# Patient Record
Sex: FEMALE | Race: WHITE | NOT HISPANIC OR LATINO | Employment: FULL TIME | URBAN - METROPOLITAN AREA
[De-identification: names, ages, dates, MRNs, and addresses within clinical notes are randomized per-mention and may not be internally consistent; named-entity substitution may affect disease eponyms.]

---

## 2023-09-11 ENCOUNTER — HOSPITAL ENCOUNTER (OUTPATIENT)
Dept: DATA CONVERSION | Facility: HOSPITAL | Age: 38
Discharge: HOME | End: 2023-09-11
Payer: COMMERCIAL

## 2023-09-11 DIAGNOSIS — Z01.419 ENCOUNTER FOR GYNECOLOGICAL EXAMINATION (GENERAL) (ROUTINE) WITHOUT ABNORMAL FINDINGS: ICD-10-CM

## 2023-09-26 PROBLEM — F41.8 OTHER SPECIFIED ANXIETY DISORDERS: Status: ACTIVE | Noted: 2023-09-26

## 2023-09-26 PROBLEM — O13.9 PREGNANCY INDUCED HYPERTENSION (HHS-HCC): Status: ACTIVE | Noted: 2023-09-26

## 2023-09-26 PROBLEM — O24.419 GESTATIONAL DIABETES MELLITUS (HHS-HCC): Status: ACTIVE | Noted: 2023-09-26

## 2023-09-26 PROBLEM — E10.9 TYPE 1 DIABETES MELLITUS (MULTI): Status: ACTIVE | Noted: 2023-09-26

## 2023-09-26 RX ORDER — IBUPROFEN 200 MG
TABLET ORAL
COMMUNITY
End: 2023-11-14 | Stop reason: ALTCHOICE

## 2023-09-26 RX ORDER — SERTRALINE HYDROCHLORIDE 50 MG/1
TABLET, FILM COATED ORAL
COMMUNITY
Start: 2020-04-28 | End: 2023-11-14 | Stop reason: ALTCHOICE

## 2023-09-26 RX ORDER — SYRINGE WITH NEEDLE, INSULIN
SYRINGE, EMPTY DISPOSABLE MISCELLANEOUS
COMMUNITY
End: 2023-11-14 | Stop reason: ALTCHOICE

## 2023-09-26 RX ORDER — SEMAGLUTIDE 2.68 MG/ML
INJECTION, SOLUTION SUBCUTANEOUS
COMMUNITY
Start: 2023-08-19 | End: 2023-11-14 | Stop reason: ALTCHOICE

## 2023-09-26 RX ORDER — SEMAGLUTIDE 1.34 MG/ML
INJECTION, SOLUTION SUBCUTANEOUS
COMMUNITY
Start: 2023-07-17 | End: 2023-11-14 | Stop reason: ALTCHOICE

## 2023-09-26 RX ORDER — SEMAGLUTIDE 0.68 MG/ML
0.5 INJECTION, SOLUTION SUBCUTANEOUS
COMMUNITY
Start: 2023-06-07 | End: 2023-11-14 | Stop reason: ALTCHOICE

## 2023-10-23 ENCOUNTER — ANCILLARY PROCEDURE (OUTPATIENT)
Dept: RADIOLOGY | Facility: CLINIC | Age: 38
End: 2023-10-23
Payer: COMMERCIAL

## 2023-10-23 ENCOUNTER — OFFICE VISIT (OUTPATIENT)
Dept: ORTHOPEDIC SURGERY | Facility: CLINIC | Age: 38
End: 2023-10-23
Payer: COMMERCIAL

## 2023-10-23 DIAGNOSIS — M25.512 LEFT SHOULDER PAIN, UNSPECIFIED CHRONICITY: ICD-10-CM

## 2023-10-23 DIAGNOSIS — M54.12 CERVICAL RADICULOPATHY: Primary | ICD-10-CM

## 2023-10-23 PROCEDURE — 1036F TOBACCO NON-USER: CPT | Performed by: ORTHOPAEDIC SURGERY

## 2023-10-23 PROCEDURE — 73030 X-RAY EXAM OF SHOULDER: CPT | Mod: LT

## 2023-10-23 PROCEDURE — 99203 OFFICE O/P NEW LOW 30 MIN: CPT | Performed by: ORTHOPAEDIC SURGERY

## 2023-10-23 ASSESSMENT — ENCOUNTER SYMPTOMS
CHILLS: 0
EYE DISCHARGE: 0
COLOR CHANGE: 0
WHEEZING: 0
FEVER: 0
TROUBLE SWALLOWING: 0
JOINT SWELLING: 1
SHORTNESS OF BREATH: 0

## 2023-10-23 ASSESSMENT — PAIN SCALES - GENERAL: PAINLEVEL_OUTOF10: 2

## 2023-10-23 ASSESSMENT — PAIN - FUNCTIONAL ASSESSMENT: PAIN_FUNCTIONAL_ASSESSMENT: 0-10

## 2023-10-23 NOTE — PROGRESS NOTES
Reason for Appointment  L shoulder blade pain     History of Present Illness  New patient is a 37 y.o. female here today for evaluation of her left shoulder.  She has had increased pain over the left trapezius and left medial scapular border.  She has had pain radiating down the arm and burning in the left forearm.  X-rays taken today of the left shoulder are reviewed and are normal.  No specific injury and recall.  She has been going to the chiropractor and symptoms were improving but over the last few weeks she has had recurrent symptoms.     History reviewed. No pertinent past medical history.    History reviewed. No pertinent surgical history.    Medication Documentation Review Audit       Reviewed by Izzy Salvador MA (Medical Assistant) on 10/23/23 at 1427      Medication Order Taking? Sig Documenting Provider Last Dose Status   ibuprofen 200 mg tablet [id number]  Directions: 3 tablet oral every six hours PRN pain Historical Provider, MD  Active   Ozempic 0.25 mg or 0.5 mg (2 mg/3 mL) pen injector [id number]  Inject 0.5 mg under the skin 1 (one) time per week. Historical Provider, MD  Active   Ozempic 1 mg/dose (4 mg/3 mL) pen injector [id number]  inject 1 mg subcutaneously every week Historical Provider, MD  Active   Ozempic 2 mg/dose (8 mg/3 mL) pen injector [id number]  inject 2 mg subcutaneously once every week Historical Provider, MD  Active   PNV133-ferrous fumarate-FA (Prenatal)  mg-mcg tablet [id number]  1 tablet Orally Once a day for 30 day(s) Historical Provider, MD  Active   sertraline (Zoloft) 50 mg tablet [id number]  1 tablet Orally Once a day for 30 day(s) Historical Provider, MD  Active                    No Known Allergies    Review of Systems   Constitutional:  Negative for chills and fever.   HENT:  Negative for mouth sores and trouble swallowing.    Eyes:  Negative for discharge.   Respiratory:  Negative for shortness of breath and wheezing.    Cardiovascular:  Negative for chest pain.    Musculoskeletal:  Positive for joint swelling.   Skin:  Negative for color change and rash.   All other systems reviewed and are negative.    Exam   On exam she has good neck motion, she has tenderness over the left trapezius and left medial scapular border.  Shoulder exam is normal, she has full active forward flexion, good cuff strength with resisted external rotation and negative impingement signs.  Deltoid is functional.  Negative Tinel's over the left ulnar and median nerves.  No atrophy in the hand.  Good pulses and sensation in the upper extremity.    Assessment   Encounter Diagnosis   Name Primary?    Left shoulder pain, unspecified chronicity    Cervical radiculopahty    Plan   She is having classic cervical radicular symptoms going down the left arm.  She has a normal shoulder exam and we will refer her to pain management for further evaluation and treatment of her neck.  We would be happy to see her back at any point for any further issues.    Written by Evelin Ruiz saw, evaluated, and treated the patient with the PA

## 2023-11-14 ENCOUNTER — PRE-ADMISSION TESTING (OUTPATIENT)
Dept: PREADMISSION TESTING | Facility: HOSPITAL | Age: 38
End: 2023-11-14
Payer: COMMERCIAL

## 2023-11-14 VITALS
SYSTOLIC BLOOD PRESSURE: 130 MMHG | HEIGHT: 64 IN | RESPIRATION RATE: 16 BRPM | TEMPERATURE: 96.6 F | WEIGHT: 157.85 LBS | HEART RATE: 80 BPM | OXYGEN SATURATION: 100 % | BODY MASS INDEX: 26.95 KG/M2 | DIASTOLIC BLOOD PRESSURE: 80 MMHG

## 2023-11-14 PROCEDURE — 99203 OFFICE O/P NEW LOW 30 MIN: CPT | Performed by: NURSE PRACTITIONER

## 2023-11-14 ASSESSMENT — DUKE ACTIVITY SCORE INDEX (DASI)
TOTAL_SCORE: 58.2
CAN YOU WALK INDOORS, SUCH AS AROUND YOUR HOUSE: YES
CAN YOU CLIMB A FLIGHT OF STAIRS OR WALK UP A HILL: YES
CAN YOU DO YARD WORK LIKE RAKING LEAVES, WEEDING OR PUSHING A MOWER: YES
CAN YOU TAKE CARE OF YOURSELF (EAT, DRESS, BATHE, OR USE TOILET): YES
CAN YOU DO HEAVY WORK AROUND THE HOUSE LIKE SCRUBBING FLOORS OR LIFTING AND MOVING HEAVY FURNITURE: YES
CAN YOU WALK A BLOCK OR TWO ON LEVEL GROUND: YES
CAN YOU RUN A SHORT DISTANCE: YES
CAN YOU PARTICIPATE IN STRENOUS SPORTS LIKE SWIMMING, SINGLES TENNIS, FOOTBALL, BASKETBALL, OR SKIING: YES
CAN YOU HAVE SEXUAL RELATIONS: YES
CAN YOU PARTICIPATE IN MODERATE RECREATIONAL ACTIVITIES LIKE GOLF, BOWLING, DANCING, DOUBLES TENNIS OR THROWING A BASEBALL OR FOOTBALL: YES
DASI METS SCORE: 9.9
CAN YOU DO MODERATE WORK AROUND THE HOUSE LIKE VACUUMING, SWEEPING FLOORS OR CARRYING GROCERIES: YES
CAN YOU DO LIGHT WORK AROUND THE HOUSE LIKE DUSTING OR WASHING DISHES: YES

## 2023-11-14 ASSESSMENT — ENCOUNTER SYMPTOMS
RESPIRATORY NEGATIVE: 1
CONSTITUTIONAL NEGATIVE: 1
GASTROINTESTINAL NEGATIVE: 1
CARDIOVASCULAR NEGATIVE: 1
EYES NEGATIVE: 1
NEUROLOGICAL NEGATIVE: 1
MUSCULOSKELETAL NEGATIVE: 1
ENDOCRINE NEGATIVE: 1
PSYCHIATRIC NEGATIVE: 1
HEMATOLOGIC/LYMPHATIC NEGATIVE: 1

## 2023-11-14 ASSESSMENT — CHADS2 SCORE
AGE GREATER THAN OR EQUAL TO 75: NO
PRIOR STROKE OR TIA OR THROMBOEMBOLISM: NO
DIABETES: NO
CHADS2 SCORE: 0
HYPERTENSION: NO
CHF: NO

## 2023-11-14 ASSESSMENT — PAIN - FUNCTIONAL ASSESSMENT: PAIN_FUNCTIONAL_ASSESSMENT: 0-10

## 2023-11-14 ASSESSMENT — PAIN SCALES - GENERAL: PAINLEVEL_OUTOF10: 0 - NO PAIN

## 2023-11-14 NOTE — H&P (VIEW-ONLY)
CPM/PAT Evaluation       Name: Fidelia Bowles (Fidelia Bowles)  /Age: 1985/37 y.o.     In-Person       Chief Complaint: ELECTIVE STERILIZATION     HPI  A 37-year-old female for elective sterilization.  History of childbirth x 3.  Denies any recent fever, chills, nausea, vomiting, abdominal pain, dysuria or hematuria.  She is scheduled for laparoscopic bilateral salpingectomy.    Past Medical History:   Diagnosis Date    Asthma     allergy induced    H/O gestational diabetes mellitus, not currently pregnant     Hypertension     h/o gestational HTN       Past Surgical History:   Procedure Laterality Date    APPENDECTOMY      BUNIONECTOMY Left     DILATION AND CURETTAGE OF UTERUS      TUMOR EXCISION      right leg benign         No Known Allergies    Current Outpatient Medications   Medication Sig Dispense Refill    ALBUTEROL INHL Inhale 2 puffs every 6 hours if needed (ASTHMA).       No current facility-administered medications for this visit.       Review of Systems   Constitutional: Negative.    HENT: Negative.     Eyes: Negative.    Respiratory: Negative.          Allergy induced asthma-cats, leaves, laughing   Cardiovascular: Negative.    Gastrointestinal: Negative.    Endocrine: Negative.    Genitourinary: Negative.    Musculoskeletal: Negative.    Skin: Negative.    Neurological: Negative.    Hematological: Negative.    Psychiatric/Behavioral: Negative.          Physical Exam  Vitals reviewed.   Constitutional:       Appearance: Normal appearance.   HENT:      Head: Normocephalic and atraumatic.      Mouth/Throat:      Mouth: Mucous membranes are moist.   Eyes:      Pupils: Pupils are equal, round, and reactive to light.      Comments: Contact lenses   Cardiovascular:      Rate and Rhythm: Normal rate and regular rhythm.      Heart sounds: Normal heart sounds.   Pulmonary:      Effort: Pulmonary effort is normal.      Breath sounds: Normal breath sounds.   Abdominal:      Palpations: Abdomen is soft.  "  Musculoskeletal:         General: Normal range of motion.      Cervical back: Normal range of motion.   Skin:     General: Skin is warm and dry.   Neurological:      Mental Status: She is alert and oriented to person, place, and time.   Psychiatric:         Mood and Affect: Mood normal.          PAT AIRWAY:   Airway:     Mallampati::  I    Neck ROM::  Full  normal        /80   Pulse 80   Temp 35.9 °C (96.6 °F) (Temporal)   Resp 16   Ht 1.626 m (5' 4\")   Wt 71.6 kg (157 lb 13.6 oz)   LMP 11/06/2023 (Exact Date)   SpO2 100%   BMI 27.09 kg/m²     CHADS2 Score: 0    Stop Bang Score 1     CHADS 2 score: 1.9%  DASI score: 58.2  METS score: 9.8  Revised cardiac risk index: 0.4%  ASA I  ARISCAT 1.6%    Assessment and Plan:     ELECTIVE STERILIZATION Plan: Laparoscopic bilateral salpingectomy.  Asthma-allergy induced      "

## 2023-11-14 NOTE — CPM/PAT H&P
CPM/PAT Evaluation       Name: Fidelia Bowles (Fidelia oBwles)  /Age: 1985/37 y.o.     In-Person       Chief Complaint: ELECTIVE STERILIZATION     HPI  A 37-year-old female for elective sterilization.  History of childbirth x 3.  Denies any recent fever, chills, nausea, vomiting, abdominal pain, dysuria or hematuria.  She is scheduled for laparoscopic bilateral salpingectomy.    Past Medical History:   Diagnosis Date    Asthma     allergy induced    H/O gestational diabetes mellitus, not currently pregnant     Hypertension     h/o gestational HTN       Past Surgical History:   Procedure Laterality Date    APPENDECTOMY      BUNIONECTOMY Left     DILATION AND CURETTAGE OF UTERUS      TUMOR EXCISION      right leg benign         No Known Allergies    Current Outpatient Medications   Medication Sig Dispense Refill    ALBUTEROL INHL Inhale 2 puffs every 6 hours if needed (ASTHMA).       No current facility-administered medications for this visit.       Review of Systems   Constitutional: Negative.    HENT: Negative.     Eyes: Negative.    Respiratory: Negative.          Allergy induced asthma-cats, leaves, laughing   Cardiovascular: Negative.    Gastrointestinal: Negative.    Endocrine: Negative.    Genitourinary: Negative.    Musculoskeletal: Negative.    Skin: Negative.    Neurological: Negative.    Hematological: Negative.    Psychiatric/Behavioral: Negative.          Physical Exam  Vitals reviewed.   Constitutional:       Appearance: Normal appearance.   HENT:      Head: Normocephalic and atraumatic.      Mouth/Throat:      Mouth: Mucous membranes are moist.   Eyes:      Pupils: Pupils are equal, round, and reactive to light.      Comments: Contact lenses   Cardiovascular:      Rate and Rhythm: Normal rate and regular rhythm.      Heart sounds: Normal heart sounds.   Pulmonary:      Effort: Pulmonary effort is normal.      Breath sounds: Normal breath sounds.   Abdominal:      Palpations: Abdomen is soft.  "  Musculoskeletal:         General: Normal range of motion.      Cervical back: Normal range of motion.   Skin:     General: Skin is warm and dry.   Neurological:      Mental Status: She is alert and oriented to person, place, and time.   Psychiatric:         Mood and Affect: Mood normal.          PAT AIRWAY:   Airway:     Mallampati::  I    Neck ROM::  Full  normal        /80   Pulse 80   Temp 35.9 °C (96.6 °F) (Temporal)   Resp 16   Ht 1.626 m (5' 4\")   Wt 71.6 kg (157 lb 13.6 oz)   LMP 11/06/2023 (Exact Date)   SpO2 100%   BMI 27.09 kg/m²     CHADS2 Score: 0    Stop Bang Score 1     CHADS 2 score: 1.9%  DASI score: 58.2  METS score: 9.8  Revised cardiac risk index: 0.4%  ASA I  ARISCAT 1.6%    Assessment and Plan:     ELECTIVE STERILIZATION Plan: Laparoscopic bilateral salpingectomy.  Asthma-allergy induced      "

## 2023-11-14 NOTE — PREPROCEDURE INSTRUCTIONS
Medication List      as of November 14, 2023  9:02 AM     You have not been prescribed any medications.         ALBUTEROL INHALER WHEN NEEDED     PAT DISCHARGE INSTRUCTIONS    Please call the Same Day Surgery (SDS) Department of the hospital where your procedure will be performed after 2:00 PM the day before your surgery. If you are scheduled on a Monday, or a Tuesday following a Monday holiday, you will need to call on the last business day prior to your surgery.    [facility]  [id number] [place] [address], [id number] [phone]    [facility]  [address], [place] [id number] [phone]    [facility]  [address] [facility] [place] [address]  [phone]    Please let your surgeon know if:      You develop any open sores, shingles, burning or painful urination as these may increase your risk of an infection.   You no longer wish to have the surgery.   Any other personal circumstances change that may lead to the need to cancel or defer this surgery-such as being sick or getting admitted to any hospital within one week of your planned procedure.    Your contact details change, such as a change of address or phone number.    Starting now:     Please DO NOT drink alcohol or smoke for 24 hours before surgery. It is well known that quitting smoking can make a huge difference to your health and recovery from surgery. The longer you abstain from smoking, the better your chances of a healthy recovery. If you need help with quitting, call 9-800-QUIT-NOW to be connected to a trained counselor who will discuss the best methods to help you quit.     Before your surgery:    Please stop all supplements 7 days prior to surgery. Or as directed by your surgeon.   Please stop taking NSAID pain medicine such as Advil and Motrin 7 days before surgery.    If you develop any fever, cough, cold, rashes, cuts, scratches, scrapes, urinary symptoms or infection anywhere on your body  (including teeth and gums) prior to surgery, please call your surgeon’s office as soon as possible. This may require treatment to reduce the chance of cancellation on the day of surgery.    The day before your surgery:   DIET- Do not eat any food after MIDNIGHT. May have 10 ounces of CLEAR LIQUIDS until TWO HOURS before your arrival time. This includes water, black tea or coffee (no milk ir cream), apple juice and electrolyte drinks (Gatorade). May chew gum until TWO hours before your surgery time.   Get a good night’s rest.  Use the special soap for bathing if you have been instructed to use one.    Scheduled surgery times may change and you will be notified if this occurs - please check your personal voicemail for any updates.     On the morning of surgery:   Wear comfortable, loose fitting clothes which open in the front. Please do not wear moisturizers, creams, lotions, makeup or perfume.    Please bring with you to surgery:   Photo ID and insurance card   Current list of medicines and allergies   Pacemaker/ Defibrillator/Heart stent cards   CPAP machine and mask    Slings/ splints/ crutches   A copy of your complete advanced directive/DHPOA.    Please do NOT bring with you to surgery:   All jewelry and valuables should be left at home.   Prosthetic devices such as contact lenses, hearing aids, dentures, eyelash extensions, hairpins and body piercings must be removed prior to going in to the surgical suite.    After outpatient surgery:   A responsible adult MUST accompany you at the time of discharge and stay with you for 24 hours after your surgery. You may NOT drive yourself home after surgery.    Do not drive, operate machinery, make critical decisions or do activities that require co-ordination or balance until after a night’s sleep.   Do not drink alcoholic beverages for 24 hours.   Instructions for resuming your medications will be provided by your surgeon.    CALL YOUR DOCTOR AFTER SURGERY IF YOU  HAVE:     Chills and/or a fever of 101° F or higher.    Redness, swelling, pus or drainage from your surgical wound or a bad smell from the wound.    Lightheadedness, fainting or confusion.    Persistent vomiting (throwing up) and are not able to eat or drink for 12 hours.    Three or more loose, watery bowel movements in 24 hours (diarrhea).   Difficulty or pain while urinating( after non-urological surgery)    Pain and swelling in your legs, especially if it is only on one side.    Difficulty breathing or are breathing faster than normal.    Any new concerning symptoms.                                       No action was needed

## 2023-11-21 ENCOUNTER — ANESTHESIA (OUTPATIENT)
Dept: OPERATING ROOM | Facility: HOSPITAL | Age: 38
End: 2023-11-21
Payer: COMMERCIAL

## 2023-11-21 ENCOUNTER — HOSPITAL ENCOUNTER (OUTPATIENT)
Facility: HOSPITAL | Age: 38
Setting detail: OUTPATIENT SURGERY
Discharge: HOME | End: 2023-11-21
Attending: OBSTETRICS & GYNECOLOGY | Admitting: OBSTETRICS & GYNECOLOGY
Payer: COMMERCIAL

## 2023-11-21 ENCOUNTER — PHARMACY VISIT (OUTPATIENT)
Dept: PHARMACY | Facility: CLINIC | Age: 38
End: 2023-11-21
Payer: MEDICARE

## 2023-11-21 ENCOUNTER — ANESTHESIA EVENT (OUTPATIENT)
Dept: OPERATING ROOM | Facility: HOSPITAL | Age: 38
End: 2023-11-21
Payer: COMMERCIAL

## 2023-11-21 VITALS
DIASTOLIC BLOOD PRESSURE: 79 MMHG | RESPIRATION RATE: 16 BRPM | OXYGEN SATURATION: 100 % | SYSTOLIC BLOOD PRESSURE: 128 MMHG | TEMPERATURE: 97.9 F | HEART RATE: 65 BPM

## 2023-11-21 DIAGNOSIS — Z30.2 ENCOUNTER FOR STERILIZATION: ICD-10-CM

## 2023-11-21 PROBLEM — J45.909 ASTHMA (HHS-HCC): Status: ACTIVE | Noted: 2023-11-21

## 2023-11-21 LAB — PREGNANCY TEST URINE, POC: NEGATIVE

## 2023-11-21 PROCEDURE — 88302 TISSUE EXAM BY PATHOLOGIST: CPT | Mod: TC | Performed by: OBSTETRICS & GYNECOLOGY

## 2023-11-21 PROCEDURE — 81025 URINE PREGNANCY TEST: CPT | Performed by: ANESTHESIOLOGY

## 2023-11-21 PROCEDURE — 7100000009 HC PHASE TWO TIME - INITIAL BASE CHARGE: Performed by: OBSTETRICS & GYNECOLOGY

## 2023-11-21 PROCEDURE — RXMED WILLOW AMBULATORY MEDICATION CHARGE

## 2023-11-21 PROCEDURE — 2720000007 HC OR 272 NO HCPCS: Performed by: OBSTETRICS & GYNECOLOGY

## 2023-11-21 PROCEDURE — 7100000001 HC RECOVERY ROOM TIME - INITIAL BASE CHARGE: Performed by: OBSTETRICS & GYNECOLOGY

## 2023-11-21 PROCEDURE — 7100000010 HC PHASE TWO TIME - EACH INCREMENTAL 1 MINUTE: Performed by: OBSTETRICS & GYNECOLOGY

## 2023-11-21 PROCEDURE — 7100000002 HC RECOVERY ROOM TIME - EACH INCREMENTAL 1 MINUTE: Performed by: OBSTETRICS & GYNECOLOGY

## 2023-11-21 PROCEDURE — 3700000001 HC GENERAL ANESTHESIA TIME - INITIAL BASE CHARGE: Performed by: OBSTETRICS & GYNECOLOGY

## 2023-11-21 PROCEDURE — 2500000005 HC RX 250 GENERAL PHARMACY W/O HCPCS: Performed by: ANESTHESIOLOGY

## 2023-11-21 PROCEDURE — 3700000002 HC GENERAL ANESTHESIA TIME - EACH INCREMENTAL 1 MINUTE: Performed by: OBSTETRICS & GYNECOLOGY

## 2023-11-21 PROCEDURE — 3600000008 HC OR TIME - EACH INCREMENTAL 1 MINUTE - PROCEDURE LEVEL THREE: Performed by: OBSTETRICS & GYNECOLOGY

## 2023-11-21 PROCEDURE — 2500000004 HC RX 250 GENERAL PHARMACY W/ HCPCS (ALT 636 FOR OP/ED): Performed by: ANESTHESIOLOGY

## 2023-11-21 PROCEDURE — 2500000004 HC RX 250 GENERAL PHARMACY W/ HCPCS (ALT 636 FOR OP/ED): Performed by: STUDENT IN AN ORGANIZED HEALTH CARE EDUCATION/TRAINING PROGRAM

## 2023-11-21 PROCEDURE — 94760 N-INVAS EAR/PLS OXIMETRY 1: CPT

## 2023-11-21 PROCEDURE — A58661 PR LAP,RMV  ADNEXAL STRUCTURE: Performed by: ANESTHESIOLOGY

## 2023-11-21 PROCEDURE — 2500000005 HC RX 250 GENERAL PHARMACY W/O HCPCS: Performed by: STUDENT IN AN ORGANIZED HEALTH CARE EDUCATION/TRAINING PROGRAM

## 2023-11-21 PROCEDURE — 3600000003 HC OR TIME - INITIAL BASE CHARGE - PROCEDURE LEVEL THREE: Performed by: OBSTETRICS & GYNECOLOGY

## 2023-11-21 RX ORDER — ONDANSETRON HYDROCHLORIDE 2 MG/ML
4 INJECTION, SOLUTION INTRAVENOUS ONCE AS NEEDED
Status: DISCONTINUED | OUTPATIENT
Start: 2023-11-21 | End: 2023-11-21 | Stop reason: HOSPADM

## 2023-11-21 RX ORDER — SODIUM CHLORIDE, SODIUM LACTATE, POTASSIUM CHLORIDE, CALCIUM CHLORIDE 600; 310; 30; 20 MG/100ML; MG/100ML; MG/100ML; MG/100ML
100 INJECTION, SOLUTION INTRAVENOUS CONTINUOUS
Status: DISCONTINUED | OUTPATIENT
Start: 2023-11-21 | End: 2023-11-21

## 2023-11-21 RX ORDER — LIDOCAINE HYDROCHLORIDE 20 MG/ML
INJECTION, SOLUTION INFILTRATION; PERINEURAL AS NEEDED
Status: DISCONTINUED | OUTPATIENT
Start: 2023-11-21 | End: 2023-11-21

## 2023-11-21 RX ORDER — ROCURONIUM BROMIDE 10 MG/ML
INJECTION, SOLUTION INTRAVENOUS AS NEEDED
Status: DISCONTINUED | OUTPATIENT
Start: 2023-11-21 | End: 2023-11-21

## 2023-11-21 RX ORDER — ALBUTEROL SULFATE 0.83 MG/ML
2.5 SOLUTION RESPIRATORY (INHALATION) ONCE
Status: DISCONTINUED | OUTPATIENT
Start: 2023-11-21 | End: 2023-11-21 | Stop reason: HOSPADM

## 2023-11-21 RX ORDER — PROPOFOL 10 MG/ML
INJECTION, EMULSION INTRAVENOUS AS NEEDED
Status: DISCONTINUED | OUTPATIENT
Start: 2023-11-21 | End: 2023-11-21

## 2023-11-21 RX ORDER — MIDAZOLAM HYDROCHLORIDE 1 MG/ML
1 INJECTION, SOLUTION INTRAMUSCULAR; INTRAVENOUS ONCE AS NEEDED
Status: DISCONTINUED | OUTPATIENT
Start: 2023-11-21 | End: 2023-11-21 | Stop reason: HOSPADM

## 2023-11-21 RX ORDER — MIDAZOLAM HYDROCHLORIDE 1 MG/ML
INJECTION, SOLUTION INTRAMUSCULAR; INTRAVENOUS AS NEEDED
Status: DISCONTINUED | OUTPATIENT
Start: 2023-11-21 | End: 2023-11-21

## 2023-11-21 RX ORDER — OXYCODONE AND ACETAMINOPHEN 5; 325 MG/1; MG/1
1 TABLET ORAL EVERY 4 HOURS PRN
Qty: 20 TABLET | Refills: 0 | Status: SHIPPED | OUTPATIENT
Start: 2023-11-21 | End: 2023-11-25

## 2023-11-21 RX ORDER — FENTANYL CITRATE 50 UG/ML
50 INJECTION, SOLUTION INTRAMUSCULAR; INTRAVENOUS EVERY 5 MIN PRN
Status: DISCONTINUED | OUTPATIENT
Start: 2023-11-21 | End: 2023-11-21 | Stop reason: HOSPADM

## 2023-11-21 RX ORDER — LIDOCAINE HYDROCHLORIDE 10 MG/ML
0.1 INJECTION INFILTRATION; PERINEURAL ONCE
Status: DISCONTINUED | OUTPATIENT
Start: 2023-11-21 | End: 2023-11-21 | Stop reason: HOSPADM

## 2023-11-21 RX ORDER — FENTANYL CITRATE 50 UG/ML
INJECTION, SOLUTION INTRAMUSCULAR; INTRAVENOUS AS NEEDED
Status: DISCONTINUED | OUTPATIENT
Start: 2023-11-21 | End: 2023-11-21

## 2023-11-21 RX ADMIN — ROCURONIUM BROMIDE 30 MG: 10 INJECTION, SOLUTION INTRAVENOUS at 10:05

## 2023-11-21 RX ADMIN — HYDROMORPHONE HYDROCHLORIDE 0.2 MG: 0.2 INJECTION, SOLUTION INTRAMUSCULAR; INTRAVENOUS; SUBCUTANEOUS at 12:14

## 2023-11-21 RX ADMIN — HYDROMORPHONE HYDROCHLORIDE 0.2 MG: 0.2 INJECTION, SOLUTION INTRAMUSCULAR; INTRAVENOUS; SUBCUTANEOUS at 11:57

## 2023-11-21 RX ADMIN — PROPOFOL 150 MG: 10 INJECTION, EMULSION INTRAVENOUS at 10:04

## 2023-11-21 RX ADMIN — FENTANYL CITRATE 50 MCG: 0.05 INJECTION, SOLUTION INTRAMUSCULAR; INTRAVENOUS at 10:04

## 2023-11-21 RX ADMIN — MIDAZOLAM HYDROCHLORIDE 2 MG: 1 INJECTION, SOLUTION INTRAMUSCULAR; INTRAVENOUS at 09:58

## 2023-11-21 RX ADMIN — MEPERIDINE HYDROCHLORIDE 12.5 MG: 25 INJECTION, SOLUTION INTRAMUSCULAR; INTRAVENOUS; SUBCUTANEOUS at 10:48

## 2023-11-21 RX ADMIN — SUGAMMADEX 200 MG: 100 INJECTION, SOLUTION INTRAVENOUS at 10:26

## 2023-11-21 RX ADMIN — LIDOCAINE HYDROCHLORIDE 5 MG: 20 INJECTION, SOLUTION INFILTRATION; PERINEURAL at 10:04

## 2023-11-21 RX ADMIN — HYDROMORPHONE HYDROCHLORIDE 0.2 MG: 0.2 INJECTION, SOLUTION INTRAMUSCULAR; INTRAVENOUS; SUBCUTANEOUS at 11:48

## 2023-11-21 RX ADMIN — HYDROMORPHONE HYDROCHLORIDE 0.2 MG: 0.2 INJECTION, SOLUTION INTRAMUSCULAR; INTRAVENOUS; SUBCUTANEOUS at 11:37

## 2023-11-21 RX ADMIN — HYDROMORPHONE HYDROCHLORIDE 0.2 MG: 0.2 INJECTION, SOLUTION INTRAMUSCULAR; INTRAVENOUS; SUBCUTANEOUS at 11:09

## 2023-11-21 RX ADMIN — FENTANYL CITRATE 50 MCG: 0.05 INJECTION, SOLUTION INTRAMUSCULAR; INTRAVENOUS at 10:19

## 2023-11-21 RX ADMIN — HYDROMORPHONE HYDROCHLORIDE 0.2 MG: 0.2 INJECTION, SOLUTION INTRAMUSCULAR; INTRAVENOUS; SUBCUTANEOUS at 11:19

## 2023-11-21 RX ADMIN — FENTANYL CITRATE 50 MCG: 50 INJECTION, SOLUTION INTRAMUSCULAR; INTRAVENOUS at 10:51

## 2023-11-21 RX ADMIN — SODIUM CHLORIDE, POTASSIUM CHLORIDE, SODIUM LACTATE AND CALCIUM CHLORIDE: 600; 310; 30; 20 INJECTION, SOLUTION INTRAVENOUS at 09:58

## 2023-11-21 ASSESSMENT — PAIN SCALES - GENERAL
PAINLEVEL_OUTOF10: 5 - MODERATE PAIN
PAINLEVEL_OUTOF10: 6
PAINLEVEL_OUTOF10: 5 - MODERATE PAIN
PAINLEVEL_OUTOF10: 6
PAINLEVEL_OUTOF10: 7
PAINLEVEL_OUTOF10: 4
PAINLEVEL_OUTOF10: 5 - MODERATE PAIN
PAINLEVEL_OUTOF10: 5 - MODERATE PAIN
PAINLEVEL_OUTOF10: 6
PAINLEVEL_OUTOF10: 0 - NO PAIN

## 2023-11-21 ASSESSMENT — PAIN - FUNCTIONAL ASSESSMENT
PAIN_FUNCTIONAL_ASSESSMENT: 0-10

## 2023-11-21 ASSESSMENT — PAIN DESCRIPTION - DESCRIPTORS
DESCRIPTORS: ACHING;SHARP
DESCRIPTORS: SHARP
DESCRIPTORS: SHARP

## 2023-11-21 ASSESSMENT — PAIN DESCRIPTION - LOCATION
LOCATION: ABDOMEN

## 2023-11-21 ASSESSMENT — COLUMBIA-SUICIDE SEVERITY RATING SCALE - C-SSRS
2. HAVE YOU ACTUALLY HAD ANY THOUGHTS OF KILLING YOURSELF?: NO
6. HAVE YOU EVER DONE ANYTHING, STARTED TO DO ANYTHING, OR PREPARED TO DO ANYTHING TO END YOUR LIFE?: NO
1. IN THE PAST MONTH, HAVE YOU WISHED YOU WERE DEAD OR WISHED YOU COULD GO TO SLEEP AND NOT WAKE UP?: NO

## 2023-11-21 NOTE — OP NOTE
LAPAROSCOPIC SALPINGECTOMIES (B) Operative Note     Date: 2023  OR Location: [facility] OR    Name: Fidelia Bowles : 1985, Age: 37 y.o., MRN: [id number], Sex: female    Diagnosis  Pre-op Diagnosis     * Encounter for sterilization [Z30.2] Post-op Diagnosis     * Encounter for sterilization [Z30.2]     Procedures  LAPAROSCOPIC SALPINGECTOMIES  09981 - [place] LAPAROSCOPY W/RMVL ADNEXAL STRUCTURES      Surgeons      * Kristi Austin - Primary    Resident/Fellow/Other Assistant:  Surgeon(s) and Role:     * Isamar Kaiser MD - Assisting    Procedure Summary  Anesthesia: Consult  ASA: I  Anesthesia Staff: Anesthesiologist: Jose Guadalupe Pearce DO; Patel Licea MD  Estimated Blood Loss: <25 mL  Intra-op Medications:   Medication Name Total Dose   lactated Ringer's infusion 97.92 mL              Anesthesia Record               Intraprocedure I/O Totals          Intake    Propofol Drip 0.00 mL    The total shown is the total volume documented since Anesthesia Start was filed.    Total Intake 0 mL          Specimen:   ID Type Source Tests Collected by Time   1 : BILATERAL FALLOPIAN TUBES Tissue FALLOPIAN TUBE SALPINGECTOMY LEFT AND RIGHT SURGICAL PATHOLOGY EXAM Kristi Austin MD 2023 1023        Staff:   Circulator: Oz Hathaway RN  Scrub Person: Bridget Arzola RN         Drains and/or Catheters: * None in log *    Tourniquet Times:         Implants:     Findings: normal pelvic anatomy    Indications: Fidelia Bowles is an 37 y.o. female who is having surgery for ELECTIVE STERILIZATION.     The patient was seen in the preoperative area. The risks, benefits, complications, treatment options, non-operative alternatives, expected recovery and outcomes were discussed with the patient. The possibilities of reaction to medication, pulmonary aspiration, injury to surrounding structures, bleeding, recurrent infection, the need for additional procedures, failure to diagnose a condition, and creating a  complication requiring transfusion or operation were discussed with the patient. The patient concurred with the proposed plan, giving informed consent.  The site of surgery was properly noted/marked if necessary per policy. The patient has been actively warmed in preoperative area. Preoperative antibiotics are not indicated. Venous thrombosis prophylaxis have been ordered including bilateral sequential compression devices    Procedure Details:   The patient was taken to the operating room and after adequate anesthesia was given the patient was placed in dorsal lithotomy position.  Examination under anesthesia was performed which revealed a small, mobile, anteverted uterus with no significant adnexal pathology palpable.  The patient's abdomen vulva, vagina,.  and inner aspects of the thighs were prepped and draped in usual sterile manner.  A De Luna catheter was inserted under sterile conditions as well.  A weighted speculum was placed in the posterior fornix of the vagina and a single-tooth tenaculum was used to grasp the anterior lip of the cervix.  The uterine manipulator was then placed and the speculum was then removed.  My attention was then directed to the patient's abdomen where an infraumbilical incision was made with sharp dissection.  A Veress needle was then inserted, and intra-abdominal application was confirmed with a drop of saline in the hub of the Veress needle that preceded easily upon lifting the abdomen.  The abdomen was then insufflated to approximately 2 liters of CO2.  At this point the Veress needle was then removed and a 5 millimeter trocar and sheath were placed without difficulty.  Laparoscopic was inserted with visualization of the pelvis.  Under direct vision a 5 millimeter trocar and sheath were placed out to the left lateral abdomen.  A right lateral incision was then placed which allowed an 8 millimeter trocar to be placed at this site under direct vision.  The patient's left tube was  then grasped with an atraumatic grasper and using the gyrus clamp the tube was removed in a stepwise fashion by undermining it with the gyrus clamp.  The mesosalpinx was clamped cauterized and cut with the tube was then dissected off the uterus.  The tube was then removed.  A similar procedure was then carried out on the patient's right side where again the right tube was grasped with an atraumatic grasper.  The tube was undermined using the gyrus clamp by 1st clamping cauterizing cutting in a stepwise fashion until the entire tube was removed.  The tube was then removed.  The pelvis was well examined and all pedicles were noted to be hemostatic.  All instruments were then removed and the CO2 was expelled.  The skin incisions were closed with 4 Monocryl suture in a subcuticular manner.  The pelvic instruments were then removed.  The patient tolerated the procedure very well and was taken to the recovery room in overall satisfactory condition.   Complications:  None; patient tolerated the procedure well.    Disposition: PACU - hemodynamically stable.  Condition: stable         Additional Details:     Attending Attestation:     Kristi Austin  Phone Number: [phone]

## 2023-11-21 NOTE — ANESTHESIA PREPROCEDURE EVALUATION
Patient: Fidelia Bowles    Procedure Information       Date/Time: 11/21/23 0341    Procedure: LAPAROSCOPIC SALPINGECTOMIES (Bilateral)    Location: [facility] / [facility]    Surgeons: Kristi Austin MD            Relevant Problems   Anesthesia (within normal limits)      Cardiovascular   (+) Pregnancy induced hypertension   No history of complications History of coronary artery bypass graft   No history of complications Pacemaker      Endocrine   (+) Gestational diabetes mellitus   (+) Type 1 diabetes mellitus (CMS/[facility])   No history of complications Diabetes mellitus, type 2 (CMS/[facility])      Neuro/Psych   (+) Other specified anxiety disorders   No history of complications CVA (cerebral vascular accident) (CMS/[facility])   No history of complications Seizures (CMS/[facility])      Pulmonary   (+) Asthma   No history of complications Chronic obstructive pulmonary disease (CMS/[facility])   No history of complications QUITA (obstructive sleep apnea)      Hematology   No history of complications Coagulopathy (CMS/[facility])       Clinical information reviewed:   Tobacco  Allergies  Meds   Med Hx  Surg Hx   Fam Hx  Soc Hx        NPO Detail:  No data recorded     Physical Exam    Airway  Mallampati: I  TM distance: >3 FB  Neck ROM: full     Cardiovascular    Dental - normal exam     Pulmonary    Abdominal            Anesthesia Plan    ASA 1     general     intravenous induction   Anesthetic plan and risks discussed with patient.  Use of blood products discussed with patient who consented to blood products.

## 2023-11-21 NOTE — ANESTHESIA POSTPROCEDURE EVALUATION
Patient: Fidelia Bowles    Procedure Summary       Date: 11/21/23 Room / Location: [facility] / [facility]    Anesthesia Start: 0958 Anesthesia Stop:     Procedure: LAPAROSCOPIC SALPINGECTOMIES (Bilateral) Diagnosis:       Encounter for sterilization      (ELECTIVE STERILIZATION)    Surgeons: Kristi Austin MD Responsible Provider: Jose Guadalupe Pearce DO    Anesthesia Type: general ASA Status: 1            Anesthesia Type: general    Vitals Value Taken Time   /92 11/21/23 1046   Temp 36 °C (96.8 °F) 11/21/23 1035   Pulse 61 11/21/23 1047   Resp 13 11/21/23 1047   SpO2 99 % 11/21/23 1047   Vitals shown include unvalidated device data.    Anesthesia Post Evaluation    Patient location during evaluation: bedside  Patient participation: complete - patient participated  Level of consciousness: awake  Pain management: adequate  Airway patency: patent  Cardiovascular status: acceptable  Respiratory status: acceptable  Hydration status: acceptable  Postoperative Nausea and Vomiting: none        There were no known notable events for this encounter.

## 2023-11-21 NOTE — DISCHARGE INSTRUCTIONS
"After Your Surgery Discharge Instructions    After Discharge Orders:  Follow up in 2 weeks          After your surgery - signs and symptoms to watch for:  Fever - Oral temperature greater than 100.4 degrees Fahrenheit  Foul-smelling vaginal discharge  Headache unrelieved by \"pain meds\"  Difficulty urinating  Increased pain, erythema or drainage at the site of the surgical incision  Difficulty breathing with or without chest pain  New calf pain especially if only on one side  Sudden, continuing increased vaginal bleeding with or without clots       What to do at home:  Resume activity gradually   Don't lift anything heavier than 10-15 pounds until OK'd by your Physician   No sex or anything in the vagina until OK'd by your Physician   Take pain medication as prescribed whenever you need them  Wear compression stockings if prescribed   To avoid/relieve constipation take stool softeners if advised    "

## 2023-11-21 NOTE — POST-PROCEDURE NOTE
1243-REPORT RECEIVED.  I BROUGHT PATIENT OVER FROM PACU.  SHE IS ALERT AND AWAKE.  3 LAP SITES TO ABDOMEN ARE EDMUNDO WITH DERMABOND, C/D/I AND NO DRAINAGE.  SHE IS WEARING A PERIPAD AND MESH PANTS.  PAIN IS 5/10.  SNACK PROVIDED.  SPOUSE BROUGHT BACK TO ROOM.      1300-TOLERATING SNACK WITHOUT N&V.      1305-DISCHARGE INSTRUCTIONS REVIEWED WITH PATIENT, VERBALIZED UNDERSTANDING.

## 2023-11-21 NOTE — ANESTHESIA PROCEDURE NOTES
Airway  Date/Time: 11/21/2023 10:12 AM  Urgency: elective    Airway not difficult    Staffing  Performed: attending   Authorized by: Jose Guadalupe Pearce DO    Performed by: Patel Licea MD  Patient location during procedure: OR    Indications and Patient Condition  Indications for airway management: anesthesia and airway protection  Spontaneous Ventilation: absent  Sedation level: deep  Preoxygenated: yes  Mask difficulty assessment: 1 - vent by mask    Final Airway Details  Final airway type: endotracheal airway      Successful airway: ETT  Cuffed: yes   Successful intubation technique: direct laryngoscopy  Blade: Rusty  Blade size: #3  ETT size (mm): 7.0  Cormack-Lehane Classification: grade I - full view of glottis  Placement verified by: capnometry   Cuff volume (mL): 8  Measured from: lips  ETT to lips (cm): 21  Number of attempts at approach: 1

## 2023-11-22 LAB
LABORATORY COMMENT REPORT: NORMAL
PATH REPORT.FINAL DX SPEC: NORMAL
PATH REPORT.GROSS SPEC: NORMAL
PATH REPORT.RELEVANT HX SPEC: NORMAL
PATH REPORT.TOTAL CANCER: NORMAL

## 2023-12-08 NOTE — PROGRESS NOTES
Subjective     Fidelia Bowles is a 38 y.o. female who presents to the clinic 2 weeks status post bilateral salpingectomies for birth control.   Pathology: benign  Eating a regular diet without difficulty.   Bowel movements are normal.   The patient is not having any pain.    Objective   LMP 11/06/2023   General:  alert and oriented, in no acute distress   Abdomen: soft, bowel sounds active, non-tender   Incision:   healing well, no drainage, no erythema, 2no seroma, no swelling, no dehiscence, incision well approximated       Assessment/Plan    Doing well postoperatively.  Operative findings again reviewed. Pathology report discussed.    1. Continue any current medications.  2. Activity restrictions: none  3. Follow up: 1 or prn

## 2023-12-11 ENCOUNTER — OFFICE VISIT (OUTPATIENT)
Dept: OBSTETRICS AND GYNECOLOGY | Facility: CLINIC | Age: 38
End: 2023-12-11
Payer: COMMERCIAL

## 2023-12-11 VITALS
SYSTOLIC BLOOD PRESSURE: 128 MMHG | BODY MASS INDEX: 25.78 KG/M2 | HEIGHT: 64 IN | DIASTOLIC BLOOD PRESSURE: 74 MMHG | WEIGHT: 151 LBS

## 2023-12-11 DIAGNOSIS — Z09 POSTOP CHECK: Primary | ICD-10-CM

## 2023-12-11 PROCEDURE — 3078F DIAST BP <80 MM HG: CPT | Performed by: OBSTETRICS & GYNECOLOGY

## 2023-12-11 PROCEDURE — 3074F SYST BP LT 130 MM HG: CPT | Performed by: OBSTETRICS & GYNECOLOGY

## 2023-12-11 PROCEDURE — 99213 OFFICE O/P EST LOW 20 MIN: CPT | Performed by: OBSTETRICS & GYNECOLOGY

## 2023-12-11 PROCEDURE — 1036F TOBACCO NON-USER: CPT | Performed by: OBSTETRICS & GYNECOLOGY

## 2023-12-11 RX ORDER — TIRZEPATIDE 2.5 MG/.5ML
2.5 INJECTION, SOLUTION SUBCUTANEOUS DAILY
COMMUNITY
Start: 2023-11-16

## 2023-12-11 ASSESSMENT — PATIENT HEALTH QUESTIONNAIRE - PHQ9
1. LITTLE INTEREST OR PLEASURE IN DOING THINGS: NOT AT ALL
2. FEELING DOWN, DEPRESSED OR HOPELESS: NOT AT ALL
SUM OF ALL RESPONSES TO PHQ9 QUESTIONS 1 & 2: 0

## 2023-12-11 ASSESSMENT — ENCOUNTER SYMPTOMS
DEPRESSION: 0
OCCASIONAL FEELINGS OF UNSTEADINESS: 0
LOSS OF SENSATION IN FEET: 0

## 2023-12-11 ASSESSMENT — LIFESTYLE VARIABLES
HOW OFTEN DO YOU HAVE A DRINK CONTAINING ALCOHOL: MONTHLY OR LESS
HOW MANY STANDARD DRINKS CONTAINING ALCOHOL DO YOU HAVE ON A TYPICAL DAY: 1 OR 2

## 2023-12-11 ASSESSMENT — PAIN SCALES - GENERAL: PAINLEVEL: 0-NO PAIN

## 2025-05-01 ENCOUNTER — APPOINTMENT (OUTPATIENT)
Dept: PRIMARY CARE | Facility: CLINIC | Age: 40
End: 2025-05-01
Payer: COMMERCIAL

## 2025-05-01 VITALS
HEIGHT: 64 IN | HEART RATE: 87 BPM | BODY MASS INDEX: 25.16 KG/M2 | OXYGEN SATURATION: 98 % | WEIGHT: 147.4 LBS | TEMPERATURE: 97.3 F | SYSTOLIC BLOOD PRESSURE: 118 MMHG | DIASTOLIC BLOOD PRESSURE: 78 MMHG

## 2025-05-01 DIAGNOSIS — R73.9 BORDERLINE HYPERGLYCEMIA: ICD-10-CM

## 2025-05-01 DIAGNOSIS — Z00.00 ANNUAL PHYSICAL EXAM: Primary | ICD-10-CM

## 2025-05-01 DIAGNOSIS — E78.2 MIXED HYPERLIPIDEMIA: ICD-10-CM

## 2025-05-01 PROBLEM — E10.9 TYPE 1 DIABETES MELLITUS: Status: RESOLVED | Noted: 2023-09-26 | Resolved: 2025-05-01

## 2025-05-01 PROBLEM — O13.9 PREGNANCY INDUCED HYPERTENSION (HHS-HCC): Status: RESOLVED | Noted: 2023-09-26 | Resolved: 2025-05-01

## 2025-05-01 PROBLEM — O24.419 GESTATIONAL DIABETES MELLITUS: Status: RESOLVED | Noted: 2023-09-26 | Resolved: 2025-05-01

## 2025-05-01 LAB — POC HEMOGLOBIN A1C: 4.3 % (ref 4.2–6.5)

## 2025-05-01 PROCEDURE — 83036 HEMOGLOBIN GLYCOSYLATED A1C: CPT | Performed by: FAMILY MEDICINE

## 2025-05-01 PROCEDURE — 99395 PREV VISIT EST AGE 18-39: CPT | Performed by: FAMILY MEDICINE

## 2025-05-01 PROCEDURE — 3008F BODY MASS INDEX DOCD: CPT | Performed by: FAMILY MEDICINE

## 2025-05-01 PROCEDURE — 1036F TOBACCO NON-USER: CPT | Performed by: FAMILY MEDICINE

## 2025-05-01 ASSESSMENT — PROMIS GLOBAL HEALTH SCALE
RATE_AVERAGE_FATIGUE: MODERATE
CARRYOUT_SOCIAL_ACTIVITIES: GOOD
EMOTIONAL_PROBLEMS: RARELY
RATE_PHYSICAL_HEALTH: VERY GOOD
RATE_SOCIAL_SATISFACTION: VERY GOOD
CARRYOUT_PHYSICAL_ACTIVITIES: COMPLETELY
RATE_MENTAL_HEALTH: VERY GOOD
RATE_QUALITY_OF_LIFE: VERY GOOD
RATE_AVERAGE_PAIN: 2
RATE_GENERAL_HEALTH: VERY GOOD

## 2025-05-01 ASSESSMENT — PATIENT HEALTH QUESTIONNAIRE - PHQ9
SUM OF ALL RESPONSES TO PHQ9 QUESTIONS 1 AND 2: 0
2. FEELING DOWN, DEPRESSED OR HOPELESS: NOT AT ALL
1. LITTLE INTEREST OR PLEASURE IN DOING THINGS: NOT AT ALL

## 2025-05-01 NOTE — PROGRESS NOTES
"Subjective   Patient ID: Fidelia Bowles is a 39 y.o. female who presents for New Patient Visit.  HPI  Here for annual visit, no issues, doing well  Had gestational diabetes 5 years ago    Review of Systems  General: no fever  Eyes: no blurry vision  ENT: no sore throat, no ear pain  Resp: no cough, sob or wheezing  Cardio: no chest pain, no palpitations  Abd: no nausea/vomiting  : no dysuria, no increased urinary frequency      /78   Pulse 87   Temp 36.3 °C (97.3 °F)   Ht 1.626 m (5' 4\")   Wt 66.9 kg (147 lb 6.4 oz)   SpO2 98%   BMI 25.30 kg/m²       Objective   Physical Exam  Gen: NAD, alert  Head: normocephalic/atraumatic  Eyes: conjunctivae normal  Ears: canals clear bilaterally, TM normal   Nose: external nose normal   Oropharynx: clear   Resp: Clear to auscultation  CVS: Regular rate and rhythm  Abdomen: soft, NT, ND  Ext: no edema, NT of lower extremities  Neuro: gait normal     Assessment/Plan   Problem List Items Addressed This Visit    None  Visit Diagnoses         Annual physical exam    -  Primary    Relevant Orders    Lipid Panel    CBC    Comprehensive Metabolic Panel      Borderline hyperglycemia        Relevant Orders    POCT glycosylated hemoglobin (Hb A1C) manually resulted      Mixed hyperlipidemia        Relevant Orders    Lipid Panel    TSH with reflex to Free T4 if abnormal      BMI 25.0-25.9,adult                   "

## 2026-05-12 ENCOUNTER — APPOINTMENT (OUTPATIENT)
Dept: PRIMARY CARE | Facility: CLINIC | Age: 41
End: 2026-05-12
Payer: COMMERCIAL

## (undated) DEVICE — GOWN, SURGICAL, SIRUS, NON REINFORCED, LARGE

## (undated) DEVICE — TROCAR, OPTICAL BLADELESS 5MM X 100 W/ADVANCED FIXATION

## (undated) DEVICE — SUTURE, VICRYL, 0, 27 IN, UR-6, VIOLET

## (undated) DEVICE — Device

## (undated) DEVICE — FORCEP PKS, CUTTING, LAP, 5MM/33CM 9-PIN

## (undated) DEVICE — INSUFFLATION TUBING, HIGH FLOW, W/FILTER

## (undated) DEVICE — STRIP, SKIN CLOSURE, STERI STRIP, REINFORCED, 0.5 X 4 IN

## (undated) DEVICE — CANNULA, TROCAR BLADE, SMOOTH, 5MM STANDARD

## (undated) DEVICE — GLOVE, SURGICAL, PROTEXIS PI , 6.5, PF, LF

## (undated) DEVICE — CATHETER TRAY, FOLEY, ALOETOUCH, 16FR, 10ML, W/ DRAINBAG

## (undated) DEVICE — SUTURE, MONOCRYL, 4-0, 27 IN, PS-2, UNDYED

## (undated) DEVICE — TISSUE ADHESIVE, EXOFIN, 1ML

## (undated) DEVICE — TUBE SET, PNEUMOCLEAR, SMOKE EVACU, HIGH-FLOW

## (undated) DEVICE — SOLUTION, COMPOUND, BENZOIN, TINCTURE, AMPULES, 0.1 CC

## (undated) DEVICE — TROCAR, KII OPTICAL SEPARATOR, 8MM X 100MM,  Z-THREAD